# Patient Record
Sex: MALE | Race: BLACK OR AFRICAN AMERICAN
[De-identification: names, ages, dates, MRNs, and addresses within clinical notes are randomized per-mention and may not be internally consistent; named-entity substitution may affect disease eponyms.]

---

## 2017-12-07 ENCOUNTER — HOSPITAL ENCOUNTER (EMERGENCY)
Dept: HOSPITAL 61 - ER | Age: 47
Discharge: HOME | End: 2017-12-07
Payer: COMMERCIAL

## 2017-12-07 VITALS — BODY MASS INDEX: 29.16 KG/M2 | WEIGHT: 175 LBS | HEIGHT: 65 IN

## 2017-12-07 VITALS — DIASTOLIC BLOOD PRESSURE: 101 MMHG | SYSTOLIC BLOOD PRESSURE: 192 MMHG

## 2017-12-07 DIAGNOSIS — Z72.89: ICD-10-CM

## 2017-12-07 DIAGNOSIS — R31.9: Primary | ICD-10-CM

## 2017-12-07 DIAGNOSIS — I10: ICD-10-CM

## 2017-12-07 LAB
BACTERIA #/AREA URNS HPF: 0 /HPF
BILIRUB UR QL STRIP: NEGATIVE
CO2 BLD-SCNC: 25 MMOL/L (ref 23–32)
GLUCOSE BLD-MCNC: 152 MG/DL (ref 70–99)
GLUCOSE UR STRIP-MCNC: NEGATIVE MG/DL
HCT VFR BLD AUTO: 48 % (ref 37–52)
NITRITE UR QL STRIP: NEGATIVE
PH UR STRIP: 6 [PH]
POTASSIUM BLD-SCNC: 3.8 MMOL/L (ref 3.5–5)
PROT UR STRIP-MCNC: NEGATIVE MG/DL
RBC #/AREA URNS HPF: 0 /HPF (ref 0–2)
SODIUM SERPL-SCNC: 141 MMOL/L (ref 135–145)
SP GR UR STRIP: >=1.03
SQUAMOUS #/AREA URNS LPF: (no result) /LPF
UROBILINOGEN UR-MCNC: 1 MG/DL
WBC #/AREA URNS HPF: (no result) /HPF (ref 0–4)

## 2017-12-07 PROCEDURE — 81001 URINALYSIS AUTO W/SCOPE: CPT

## 2017-12-07 PROCEDURE — 80047 BASIC METABLC PNL IONIZED CA: CPT

## 2017-12-07 PROCEDURE — 36415 COLL VENOUS BLD VENIPUNCTURE: CPT

## 2017-12-07 PROCEDURE — 85014 HEMATOCRIT: CPT

## 2017-12-07 PROCEDURE — 85018 HEMOGLOBIN: CPT

## 2017-12-07 NOTE — PHYS DOC
Past Medical History


Past Medical History:  Hypertension


Past Surgical History:  No Surgical History


Additional Past Surgical Histo:  PT DENIES


Alcohol Use:  Occasionally


Additional Information:  


DAILY BEERS


Drug Use:  None





Adult General


Chief Complaint


Chief Complaint:  BLOOD IN URINE





HPI


HPI





Patient is a 47  year old male who presents with blood in his semen and urine 

after intercourse yesterday.  Then today he passed some clots in his urine.  He 

then came in due to his concern after looking up what the possible causes could 

be on the Internet. he denies any current symptoms, he states he had some lower 

abdominal cramping at one time but nothing present. Patient reports history of 

hypertension but hasn't taken his meds for nearly one year because he hasn't 

been seen a doctor. No nausea or vomiting, no chest pain or shortness of breath

, no focal weakness.





Review of Systems


Review of Systems





Constitutional: Denies fever or chills []


Eyes: Denies change in visual acuity, redness, or eye pain []


HENT: Denies nasal congestion or sore throat []


Respiratory: Denies cough or shortness of breath []


Cardiovascular: Denies chest pain


GI: Denies abdominal pain, nausea, vomiting, bloody stools or diarrhea []


: per history of present illness


Musculoskeletal: Denies back pain or joint pain []


Integument: Denies rash or skin lesions []


Neurologic: Denies headache, focal weakness or sensory changes []





Allergies


Allergies





Allergies








Coded Allergies Type Severity Reaction Last Updated Verified


 


  No Known Drug Allergies    10/10/14 No











Physical Exam


Physical Exam





Constitutional: Well developed, well nourished, no acute distress, non-toxic 

appearance. []


HENT: Normocephalic, atraumatic, bilateral external ears normal, oropharynx 

moist, no oral exudates, nose normal. []


Eyes: PERRLA, EOMI, conjunctiva normal, no discharge. [] 


Neck: Normal range of motion, no tenderness, supple, no stridor. [] 


Cardiovascular:Heart rate regular with regular rhythm, no murmur []


Lungs & Thorax:  Bilateral breath sounds clear to auscultation, no wheeze or 

crackles


Abdomen:  soft, no tenderness, no masses, nondistended


: normal external genitalia, nontender/nonswollen testicles bilaterally, 

small left inguinal hernia, slightly enlarged prostate on rectal exam no 

bogginess noted.


Skin: Warm, dry, no erythema, no rash. [] 


Back: No tenderness, no CVA tenderness. [] 


Extremities: No tenderness, no cyanosis, no clubbing, ROM intact, no edema. [] 


Neurologic: Alert and oriented X 3, normal motor function, normal sensory 

function, no focal deficits noted. []


Psychologic: Affect normal, judgement normal, mood normal. []





Current Patient Data


Vital Signs





 Vital Signs








  Date Time  Temp Pulse Resp B/P (MAP) Pulse Ox O2 Delivery O2 Flow Rate FiO2


 


12/7/17 13:47  57 18 192/101 (131) 95 Room Air  


 


12/7/17 12:50 97.7       





 97.7       








Lab Values





 Laboratory Tests








Test


  12/7/17


12:50 12/7/17


13:27


 


Urine Collection Type Unknown   


 


Urine Color Yellow   


 


Urine Clarity Cloudy   


 


Urine pH 6.0   


 


Urine Specific Gravity >=1.030   


 


Urine Protein


  Negative mg/dL


(NEG-TRACE) 


 


 


Urine Glucose (UA)


  Negative mg/dL


(NEG) 


 


 


Urine Ketones (Stick)


  Negative mg/dL


(NEG) 


 


 


Urine Blood


  Negative (NEG)


  


 


 


Urine Nitrite


  Negative (NEG)


  


 


 


Urine Bilirubin


  Negative (NEG)


  


 


 


Urine Urobilinogen Dipstick


  1.0 mg/dL (0.2


mg/dL) 


 


 


Urine Leukocyte Esterase


  Negative (NEG)


  


 


 


Urine RBC 0 /HPF (0-2)   


 


Urine WBC


  Occ /HPF (0-4)


  


 


 


Urine Squamous Epithelial


Cells Occ /LPF  


  


 


 


Urine Bacteria


  0 /HPF (0-FEW)


  


 


 


Urine Mucus Slight /LPF   


 


POC Hemoglobin


  


  16.3 g/dL


(14-18)


 


POC Hematocrit  48 % (37-52)  


 


POC Sodium


  


  141 mmol/L


(135-145)


 


POC Potassium


  


  3.8 mmol/L


(3.5-5.0)


 


POC Chloride


  


  107 mmol/L


()


 


POC Total CO2


  


  25 mmol/L


(23-32)


 


Anion Gap


  


  14 mmol/L


(6-14)


 


POC Blood Urea Nitrogen


  


  23 mg/dL


(8-26)


 


POC Creatinine


  


  1.4 mg/dL


(0.5-1.4)


 


Glucose Level


  


  152 mg/dL


(70-99)  H


 


POC Ionized Calcium (Cathy)


  


  1.13 mmol/L


(1.13-1.32)





 Laboratory Tests


12/7/17 13:27














EKG


EKG


[]





Radiology/Procedures


Radiology/Procedures


[]





Course & Med Decision Making


Course & Med Decision Making


Pertinent Labs and Imaging studies reviewed. (See chart for details)





chem 8 didn't show significant change in Cr.  UA didn't show infection or 

blood.  I counseled pt that could be from a number of causes but no acute 

infection noted today, no active blood.  I strongly encourage pt to f/u with 

urologist and a PCP for these issues.  Pt was prescribed norvasc and hctz, 

referral sheet given, referral to [facility].





Dragon Disclaimer


Dragon Disclaimer


This electronic medical record was generated, in whole or in part, using a 

voice recognition dictation system.





Departure


Departure


Impression:  


 Primary Impression:  


 Hypertension


 Additional Impression:  


 Hematuria


Disposition:  01 HOME, SELF-CARE


Condition:  STABLE


Patient Instructions:  Hypertension, Hematuria, Adult


Scripts


Hydrochlorothiazide (HYDROCHLOROTHIAZIDE TABLET  **) 25 Mg Tablet


1 TAB PO DAILY, #30 TAB 0 Refills


   Prov: ELZBIETA GEORGE MD         12/7/17 


Amlodipine Besylate (NORVASC) 5 Mg Tablet


1 TAB PO DAILY, #30 TAB 0 Refills


   Prov: ELZBIETA GEORGE MD         12/7/17





Problem Qualifiers











ELZBIETA GEORGE MD Dec 7, 2017 14:31

## 2018-10-06 ENCOUNTER — HOSPITAL ENCOUNTER (EMERGENCY)
Dept: HOSPITAL 61 - ER | Age: 48
Discharge: HOME | End: 2018-10-06
Payer: SELF-PAY

## 2018-10-06 VITALS — WEIGHT: 175 LBS | HEIGHT: 65 IN | BODY MASS INDEX: 29.16 KG/M2

## 2018-10-06 VITALS — DIASTOLIC BLOOD PRESSURE: 109 MMHG | SYSTOLIC BLOOD PRESSURE: 196 MMHG
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
## 2018-10-06 DIAGNOSIS — I10: ICD-10-CM
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
## 2018-10-06 DIAGNOSIS — S61.214A: Primary | ICD-10-CM

## 2018-10-06 PROCEDURE — 90715 TDAP VACCINE 7 YRS/> IM: CPT

## 2018-10-06 PROCEDURE — 12001 RPR S/N/AX/GEN/TRNK 2.5CM/<: CPT

## 2018-10-06 PROCEDURE — 99283 EMERGENCY DEPT VISIT LOW MDM: CPT

## 2018-10-06 PROCEDURE — 90471 IMMUNIZATION ADMIN: CPT

## 2018-10-06 NOTE — PHYS DOC
Past Medical History


Past Medical History:  Hypertension


Past Surgical History:  No Surgical History


Additional Past Surgical Histo:  PT DENIES


Alcohol Use:  Occasionally


Drug Use:  None





Adult General


Chief Complaint


Chief Complaint:  LACERATION/AVULSION





HPI


HPI





Patient is a 48  year old male with history of hypertension who presents today 

with right ring finger laceration that occurred yesterday. Patient states he 

accidentally cut his finger on a piece of metal in his truck. Patient is right-

handed. He states bleeding has been going on since yesterday.





Review of Systems


Review of Systems





Constitutional: Denies fever or chills []


Musculoskeletal: Denies back pain or joint pain []


Integument: Right ring finger laceration


Neurologic: Denies headache, focal weakness or sensory changes []








All other systems were reviewed and found to be within normal limits, except as 

documented in this note.





Current Medications


Current Medications





Current Medications








 Medications


  (Trade)  Dose


 Ordered  Sig/Yohannes  Start Time


 Stop Time Status Last Admin


Dose Admin


 


 Diphtheria/


 Tetanus/Acell


 Pertussis


  (Boostrix)  0.5 ml  ONCE ONCE  10/6/18 11:30


 10/6/18 11:31 DC 10/6/18 12:07


0.5 ML


 


 Lidocaine HCl


  (Xylocaine 1% Pf


 30ml Vial)  30 ml  1X  ONCE  10/6/18 11:30


 10/6/18 11:31 Cancel  





 


 Lidocaine/Sodium


 Bicarbonate


  (Buffered


 Lidocaine 1%)  6 ml  1X  ONCE  10/6/18 12:00


 10/6/18 12:01 DC 10/6/18 12:05


6 ML











Allergies


Allergies





Allergies








Coded Allergies Type Severity Reaction Last Updated Verified


 


  No Known Drug Allergies    10/10/14 No











Physical Exam


Physical Exam





Constitutional: Well developed, well nourished, no acute distress, non-toxic 

appearance. [] 


Skin: Warm, dry, ventral aspect of the right ring finger mid phalanges with a 

laceration approximately 2 cm long, small amount of bleeding noted from the 

laceration site. No obvious tendon laceration. Patient able to flex and extend 

the finger at the MIP PIP and DIP joints. Adequate ulnar sensation to the right 

ring finger. +2 right radial pulse. Cap refill less than 2 seconds the right 

ring finger.


Back: No tenderness, no CVA tenderness. [] 


Extremities: No tenderness, no cyanosis, no clubbing, ROM intact, no edema. [] 


Neurologic: Alert and oriented X 3, normal motor function, normal sensory 

function, no focal deficits noted. []


Psychologic: Affect normal, judgement normal, mood normal. []





Current Patient Data


Vital Signs





 Vital Signs








  Date Time  Temp Pulse Resp B/P (MAP) Pulse Ox O2 Delivery O2 Flow Rate FiO2


 


10/6/18 11:23 98.4 78 16 196/109 (138) 97 Room Air  





 98.4       











EKG


EKG


[]





Radiology/Procedures


Radiology/Procedures


Laceration/Wound Repair


   Wound Location: Right ring finger


   Wound's Depth, Shape: C


   Wound Length (cm): Approximately 2 cm


   Wound Explored:  clean


   Irrigated w/ Saline (ccs): 30


   Betadine Prep?: Yes


   Anesthesia: 1% buffered lidocaine


   Volume Anesthetic (ccs): 4 mL


   Wound Repaired With: Ethilon


   Suture Size/Type: 5/5 interrupted sutures





Progress  : Wound was covered with nonstick dressing





Course & Med Decision Making


Course & Med Decision Making


Pertinent Labs and Imaging studies reviewed. (See chart for details)





This is a 48-year-old male patient presenting to the ED today with right ring 

finger laceration that occurred yesterday. The laceration was still bleeding on 

arrival to the ED. Laceration was closed with 5 interrupted sutures by me. 

Patient informed the laceration may not close all the way because it's been 

open for more than 12 hours. Bleeding stopped after laceration was closed. 

Tetanus was administered. Discharged with cephalexin. Wound care instructions 

and return precautions provided.





Dragon Disclaimer


Dragon Disclaimer


This electronic medical record was generated, in whole or in part, using a 

voice recognition dictation system.





Departure


Departure


Impression:  


 Primary Impression:  


 Laceration of finger


Disposition:  01 HOME, SELF-CARE


Condition:  STABLE


Referrals:  


NO PCP (PCP)


Follow-up with any emergency room in 7-10 days for suture removal


Patient Instructions:  Fingertip Laceration





Additional Instructions:  


You have laceration to the right pinky finger that was closed with stitches. 

Stop at any hospital/emergency room or urgent care and have the stitches 

removed in 7-10 days. Complete your oral antibiotics. Keep the area clean and 

dry. Return to the emergency room at any point wound condition worsens 

especially if he starts draining yellow purulent material, becomes warm and red.


Scripts


Cephalexin (CEPHALEXIN) 500 Mg Tablet


1 TAB PO BID, #14 TAB


   Prov: BECK MASTERS         10/6/18





Problem Qualifiers








 Primary Impression:  


 Laceration of finger


 Encounter type:  initial encounter  Finger:  ring finger  Damage to nail status

:  without damage  Foreign body presence:  without foreign body  Laterality:  

right  Qualified Codes:  S61.214A - Laceration without foreign body of right 

ring finger without damage to nail, initial encounter








BECK MASTERS Oct 6, 2018 12:30